# Patient Record
Sex: FEMALE | Race: WHITE | NOT HISPANIC OR LATINO | URBAN - METROPOLITAN AREA
[De-identification: names, ages, dates, MRNs, and addresses within clinical notes are randomized per-mention and may not be internally consistent; named-entity substitution may affect disease eponyms.]

---

## 2017-05-02 ENCOUNTER — EMERGENCY (EMERGENCY)
Facility: HOSPITAL | Age: 37
LOS: 1 days | Discharge: HOME | End: 2017-05-02
Attending: PERSONAL EMERGENCY RESPONSE ATTENDANT | Admitting: PERSONAL EMERGENCY RESPONSE ATTENDANT
Payer: COMMERCIAL

## 2017-05-02 VITALS
SYSTOLIC BLOOD PRESSURE: 138 MMHG | HEART RATE: 98 BPM | OXYGEN SATURATION: 99 % | RESPIRATION RATE: 16 BRPM | TEMPERATURE: 98 F | DIASTOLIC BLOOD PRESSURE: 87 MMHG

## 2017-05-02 DIAGNOSIS — S09.90XA UNSPECIFIED INJURY OF HEAD, INITIAL ENCOUNTER: ICD-10-CM
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
## 2017-05-02 DIAGNOSIS — K21.9 GASTRO-ESOPHAGEAL REFLUX DISEASE WITHOUT ESOPHAGITIS: ICD-10-CM
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
## 2017-05-02 DIAGNOSIS — Z91.040 LATEX ALLERGY STATUS: ICD-10-CM
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
## 2017-05-02 PROCEDURE — 99283 EMERGENCY DEPT VISIT LOW MDM: CPT

## 2017-05-02 NOTE — ED PROVIDER NOTE - CHPI ED SYMPTOMS NEG
no change in level of consciousness/no dizziness/no blurred vision/no nausea/no confusion/no vomiting

## 2017-05-02 NOTE — ED PROVIDER NOTE - ATTENDING CONTRIBUTION TO CARE
Agree with above.  PT is a 36 yr old female presenting to ED with head injury saturday night in which she hit her head on a metal pole. Pt has mild pressure-like sensation over the L side of her head.  She endorses a small bump to L side of her head at that time.  Main complaint is that it ‘hurts when she sleeps’ on L side.  NO palpable deformity.  No visible laceration/skin changes.  Neuro exam intact.  NO n/v/LOC at time or since.  Pt is well appearing.  Concussion precautions given.  Pt advised brain rest until resolution, alternate tylenol/motrin.  Follow-up with PCP/neuro as needed.

## 2017-05-02 NOTE — ED ADULT NURSE NOTE - OBJECTIVE STATEMENT
36 year old female A&OX3 presents with head injury that occurred on Saturday. Patient states she was in a closet and stood up and hit her head on the left side. Patient states that initially she felt ok but now has had increase pain on the left side. Patient denies nausea, vomiting, dizziness, weakness, vision changes. Patient also denies loc.

## 2017-05-02 NOTE — ED PROVIDER NOTE - PLAN OF CARE
1)Take Tylenol 650 every 4 hours for headache.    2) Follow up with PMD in 2-3 days   3) Return to ED for worsening headache, blurry vison, weakness or any other concerning symptoms

## 2017-05-02 NOTE — ED PROVIDER NOTE - MEDICAL DECISION MAKING DETAILS
AR[place]:  Pt reassured.  Did not lose consciousness, no vomiting/nausea since event.  Pt has remained A & O x 3 and is in no acute distress.  No palpable deformities of skull.  No skin changes/lesions/lacerations.

## 2017-05-02 NOTE — ED PROVIDER NOTE - CARE PLAN
Principal Discharge DX:	Injury of head, initial encounter  Instructions for follow-up, activity and diet:	1)Take Tylenol 650 every 4 hours for headache.    2) Follow up with PMD in 2-3 days   3) Return to ED for worsening headache, blurry vison, weakness or any other concerning symptoms

## 2017-05-02 NOTE — ED PROVIDER NOTE - OBJECTIVE STATEMENT
37 y/o female w/ a PMH of GERD and NCAH presents s/p hitting her head on a pole upon standing up 3 days ago. She currently complains of a pressure sensation over the left side of her head that has been constant for the past 3 days. Patient describes having "bump" and small laceration over left superior forehead that has decreased with ice over the past few days.  She denies LOC, dizziness, blurry vision, N/V.

## 2019-08-15 ENCOUNTER — APPOINTMENT (OUTPATIENT)
Dept: OTOLARYNGOLOGY | Facility: CLINIC | Age: 39
End: 2019-08-15

## 2022-05-10 NOTE — ED PROVIDER NOTE - SKIN NEGATIVE STATEMENT, MLM
no abrasions, no jaundice, no lesions, no pruritis, and no rashes. Birth Control Pills Pregnancy And Lactation Text: This medication should be avoided if pregnant and for the first 30 days post-partum.

## 2024-04-02 ENCOUNTER — APPOINTMENT (OUTPATIENT)
Dept: ORTHOPEDIC SURGERY | Facility: CLINIC | Age: 44
End: 2024-04-02
Payer: COMMERCIAL

## 2024-04-02 DIAGNOSIS — M25.561 PAIN IN RIGHT KNEE: ICD-10-CM

## 2024-04-02 DIAGNOSIS — M23.91 UNSPECIFIED INTERNAL DERANGEMENT OF RIGHT KNEE: ICD-10-CM

## 2024-04-02 PROCEDURE — 73562 X-RAY EXAM OF KNEE 3: CPT | Mod: RT

## 2024-04-02 PROCEDURE — 99203 OFFICE O/P NEW LOW 30 MIN: CPT

## 2024-04-02 RX ORDER — CLONAZEPAM 0.5 MG/1
0.5 TABLET, ORALLY DISINTEGRATING ORAL
Refills: 0 | Status: ACTIVE | COMMUNITY

## 2024-04-02 RX ORDER — SERTRALINE HYDROCHLORIDE 50 MG/1
50 TABLET, FILM COATED ORAL
Refills: 0 | Status: ACTIVE | COMMUNITY

## 2024-04-02 NOTE — ASSESSMENT
[FreeTextEntry1] : right knee pain with misstep. some lateral pain.  minimal oa.  concern for lateral meniscus tear.  discussed the role of prn nsaids and ice will get mri to evaluate possible lateral meniscal tear due to + traumatic mechanism with continued mechanical symptoms and + zen and + lat joint line tenderness on exam will start on therapy as well

## 2024-04-02 NOTE — IMAGING
[Right] : right knee [AP] : anteroposterior [Lateral] : lateral [[place]] : skyline [FreeTextEntry9] : mild medial narrowing

## 2024-04-02 NOTE — PHYSICAL EXAM
[5___] : hamstring 5[unfilled]/5 [NL (0)] : extension 0 degrees [Equivocal] : equivocal Chrissy [] : negative Lachmann [Right] : right knee [There are no fractures, subluxations or dislocations. No significant abnormalities are seen] : There are no fractures, subluxations or dislocations. No significant abnormalities are seen [TWNoteComboBox7] : flexion 125 degrees

## 2024-04-02 NOTE — HISTORY OF PRESENT ILLNESS
[Right Leg] : right leg [7] : 7 [Gradual] : gradual [6] : 6 [Intermittent] : intermittent [Stabbing] : stabbing [Rest] : rest [Exercising] : exercising [de-identified] : 4/2/24- 2 weeks ago had slip and mis step in the shower. Now with laterally based right knee pain. Worse with startup and lateral movements, she notes mechanical symptoms with ambulation   she works in interior design  denies contributory pmh  [] : This patient has had an injection before: no [FreeTextEntry5] : Pt initially went to fall in the shower about 2 weeks ago but caught herself a few times and jolted her knee, pt has had a gradual worsening of pain on the lateral side of her right knee  [de-identified] : nonne

## 2025-05-27 ENCOUNTER — OFFICE (OUTPATIENT)
Dept: URBAN - METROPOLITAN AREA CLINIC 77 | Facility: CLINIC | Age: 45
Setting detail: OPHTHALMOLOGY
End: 2025-05-27
Payer: COMMERCIAL

## 2025-05-27 DIAGNOSIS — H31.002: ICD-10-CM

## 2025-05-27 DIAGNOSIS — H35.363: ICD-10-CM

## 2025-05-27 PROCEDURE — 92004 COMPRE OPH EXAM NEW PT 1/>: CPT | Performed by: STUDENT IN AN ORGANIZED HEALTH CARE EDUCATION/TRAINING PROGRAM

## 2025-05-27 PROCEDURE — 92015 DETERMINE REFRACTIVE STATE: CPT | Performed by: STUDENT IN AN ORGANIZED HEALTH CARE EDUCATION/TRAINING PROGRAM

## 2025-05-27 PROCEDURE — 92250 FUNDUS PHOTOGRAPHY W/I&R: CPT | Performed by: STUDENT IN AN ORGANIZED HEALTH CARE EDUCATION/TRAINING PROGRAM

## 2025-05-27 ASSESSMENT — REFRACTION_AUTOREFRACTION
OD_CYLINDER: -1.00
OD_SPHERE: -8.75
OS_AXIS: 053
OS_SPHERE: -8.25
OD_AXIS: 052
OS_CYLINDER: -1.75

## 2025-05-27 ASSESSMENT — KERATOMETRY
OS_K1POWER_DIOPTERS: 44.00
OS_AXISANGLE_DEGREES: 131
OS_K2POWER_DIOPTERS: 44.75
OD_AXISANGLE_DEGREES: 090
OD_K2POWER_DIOPTERS: 44.75
OD_K1POWER_DIOPTERS: 44.75

## 2025-05-27 ASSESSMENT — REFRACTION_CURRENTRX
OS_SPHERE: -7.50
OD_SPHERE: -7.50
OD_CYLINDER: -0.50
OS_CYLINDER: -0.75
OS_AXIS: 70
OS_OVR_VA: 20/
OD_AXIS: 75
OD_OVR_VA: 20/

## 2025-05-27 ASSESSMENT — TONOMETRY
OD_IOP_MMHG: 16
OS_IOP_MMHG: 14

## 2025-05-27 ASSESSMENT — VISUAL ACUITY
OD_BCVA: 20/50
OS_BCVA: 20/60

## 2025-05-27 ASSESSMENT — REFRACTION_MANIFEST
OD_VA1: 20/25
OS_AXIS: 065
OS_CYLINDER: -1.00
OD_CYLINDER: -0.50
OS_VA1: 20/30
OD_AXIS: 070
OD_SPHERE: -8.25
OS_SPHERE: -8.00

## 2025-05-27 ASSESSMENT — CONFRONTATIONAL VISUAL FIELD TEST (CVF)
OD_FINDINGS: FULL
OS_FINDINGS: FULL